# Patient Record
Sex: FEMALE | Race: WHITE
[De-identification: names, ages, dates, MRNs, and addresses within clinical notes are randomized per-mention and may not be internally consistent; named-entity substitution may affect disease eponyms.]

---

## 2019-01-04 ENCOUNTER — HOSPITAL ENCOUNTER (OUTPATIENT)
Dept: HOSPITAL 92 - SDC | Age: 69
Discharge: HOME | End: 2019-01-04
Attending: PLASTIC SURGERY
Payer: COMMERCIAL

## 2019-01-04 VITALS — BODY MASS INDEX: 20.2 KG/M2

## 2019-01-04 DIAGNOSIS — Z88.0: ICD-10-CM

## 2019-01-04 DIAGNOSIS — Z79.899: ICD-10-CM

## 2019-01-04 DIAGNOSIS — I48.92: ICD-10-CM

## 2019-01-04 DIAGNOSIS — Z79.01: ICD-10-CM

## 2019-01-04 DIAGNOSIS — Z88.2: ICD-10-CM

## 2019-01-04 DIAGNOSIS — Z41.1: Primary | ICD-10-CM

## 2019-01-04 DIAGNOSIS — L90.9: ICD-10-CM

## 2019-01-04 PROCEDURE — 96374 THER/PROPH/DIAG INJ IV PUSH: CPT

## 2019-01-04 PROCEDURE — 0W060ZZ ALTERATION OF NECK, OPEN APPROACH: ICD-10-PCS | Performed by: PLASTIC SURGERY

## 2019-01-04 PROCEDURE — S0020 INJECTION, BUPIVICAINE HYDRO: HCPCS

## 2019-01-07 NOTE — OP
DATE OF PROCEDURE:  01/04/2019



PREOPERATIVE DIAGNOSIS:  Facial aging.



POSTOPERATIVE DIAGNOSIS:  Facial aging.



PROCEDURE PERFORMED:  Facelift.



DESCRIPTION OF PROCEDURE:  Upon induction of adequate anesthesia, the patient 
was

prepped and draped in supine position. Her right hemiface and central neck were

infused with tumescent fluid. After this had adequate time to take effect

submental liposuction was done to an endpoint of good symmetry. The 
preauricular incisions were made. The skin flaps

were raised in a subcutaneous plane.  A tight loop plication of the SMAS was 
done using 3-0 Ethibond suture and a

from the jowl to the towards the root of the helix.  Plication along the 
lateral zygoma  and down

inferiorly to retract the platysma posteriorly. This was down with 3-0 
interrupted

and running PDS suture. A drain was placed, field was copious irrigated and

inspected for meticulous hemostasis. The excess skin was marked and excised. 



A similar procedure was done on the contralateral side. Preauricular incisions 
were

closed with 4-0 Monocryl and 6-0 Prolene. Retroauricular incisions were closed 
with

3-0 PDS suture and 3-0 Prolene suture. The patient tolerated the procedure 
well. 







Job ID:  [id number]



[facility]